# Patient Record
Sex: FEMALE | Race: ASIAN | NOT HISPANIC OR LATINO | Employment: UNEMPLOYED | URBAN - METROPOLITAN AREA
[De-identification: names, ages, dates, MRNs, and addresses within clinical notes are randomized per-mention and may not be internally consistent; named-entity substitution may affect disease eponyms.]

---

## 2024-05-25 ENCOUNTER — APPOINTMENT (OUTPATIENT)
Dept: RADIOLOGY | Facility: CLINIC | Age: 19
End: 2024-05-25
Payer: COMMERCIAL

## 2024-05-25 ENCOUNTER — HOSPITAL ENCOUNTER (EMERGENCY)
Facility: CLINIC | Age: 19
Discharge: HOME | End: 2024-05-25
Payer: COMMERCIAL

## 2024-05-25 VITALS
DIASTOLIC BLOOD PRESSURE: 75 MMHG | WEIGHT: 135 LBS | HEART RATE: 100 BPM | OXYGEN SATURATION: 100 % | SYSTOLIC BLOOD PRESSURE: 137 MMHG | BODY MASS INDEX: 26.5 KG/M2 | HEIGHT: 60 IN | RESPIRATION RATE: 18 BRPM | TEMPERATURE: 98 F

## 2024-05-25 DIAGNOSIS — S89.92XA KNEE INJURY, LEFT, INITIAL ENCOUNTER: ICD-10-CM

## 2024-05-25 PROCEDURE — 99284 EMERGENCY DEPT VISIT MOD MDM: CPT | Mod: 25

## 2024-05-25 PROCEDURE — 29505 APPLICATION LONG LEG SPLINT: CPT | Mod: LT

## 2024-05-25 PROCEDURE — 73562 X-RAY EXAM OF KNEE 3: CPT | Mod: LT

## 2024-05-25 ASSESSMENT — ACTIVITIES OF DAILY LIVING (ADL): ADLS_ACUITY_SCORE: 33

## 2024-05-25 ASSESSMENT — COLUMBIA-SUICIDE SEVERITY RATING SCALE - C-SSRS
1. IN THE PAST MONTH, HAVE YOU WISHED YOU WERE DEAD OR WISHED YOU COULD GO TO SLEEP AND NOT WAKE UP?: NO
6. HAVE YOU EVER DONE ANYTHING, STARTED TO DO ANYTHING, OR PREPARED TO DO ANYTHING TO END YOUR LIFE?: NO
2. HAVE YOU ACTUALLY HAD ANY THOUGHTS OF KILLING YOURSELF IN THE PAST MONTH?: NO

## 2024-05-25 NOTE — DISCHARGE INSTRUCTIONS
Knee xray was reassuring. I anticipate you sustained a patellar dislocation +/- soft tissue injury.    NO VOLLEYBALL OR PHYSICAL ACTIVITY UNTIL YOU ARE CLEARED BY ORTHOPEDICS!!   Follow up with [facility]. [address] [place] [address] [phone]. Tell them you were seen in our emergency department and advised to be evaluated by their team.  Wear the immobilization brace to add stability to your injured joint/extremity until you are evaluated by orthopedics. This brace does not need to be worn while bathing.  Use crutches to walk until you are evaluated by orthopedics.  For pain, I recommend:  Ibuprofen (Advil) - 600 MG every 6 hours  Acetaminophen (Tylenol) - 500-1,000 MG every 6 hours  Follow the RICE protocol to help speed recovery:  Rest - Avoid putting weight on the painful joint/extremity and avoid activities that worsen the pain.  Ice - Use cold packs for 20 minutes at a time, every two to four hours, particularly after physical activity.   Compression - Lightly wrap the injured area in a soft bandage or ACE wrap until the inflammation dies down. Be sure not to compress the injured area too tightly.  Elevation - Elevate the injury higher than your heart while resting to reduce swelling    If symptoms worsen or you develop any of the following, please do not hesitate to come back to this emergency department for additional evaluation.   Call 911 anytime you think you may need emergency care. For example, call if:    You have symptoms of a blood clot in your lung (called a pulmonary embolism). These may include:  Sudden chest pain.  Trouble breathing.  Coughing up blood.   Call your doctor now or seek immediate medical care if:    You have severe or increasing pain.     Your leg or foot turns cold or changes color.     You cannot stand or put weight on your knee.     Your knee looks twisted or bent out of shape.     You cannot move your knee.     You have signs of infection,  such as:  Increased pain, swelling, warmth, or redness.  Red streaks leading from the knee.  Pus draining from a place on your knee.  A fever.     You have signs of a blood clot in your leg (called a deep vein thrombosis), such as:  Pain in your calf, back of the knee, thigh, or groin.  Redness and swelling in your leg or groin.   Watch closely for changes in your health, and be sure to contact your doctor if:    You have tingling, weakness, or numbness in your knee.     You have any new symptoms, such as swelling.     You have bruises from a knee injury that last longer than 2 weeks.     You do not get better as expected.

## 2024-05-25 NOTE — ED TRIAGE NOTES
Pt presents to the ED with c/o left knee pain after landing awkwardly while jumping in volleyball game. Pt felt/heard a pop.

## 2024-05-25 NOTE — ED PROVIDER NOTES
"Emergency Department Encounter  [facility] EMERGENCY ROOM  Carina Siddiqui   AGE: 19 year old SEX: female  YOB: 2005  MRN: [id number]      EVALUATION DATE & TIME: No admission date for patient encounter. ; PCP: No primary care provider on file.   ED PROVIDER: Aubrie Leija PA-C    CHIEF COMPLAINT: Knee Pain      MEDICAL DECISION MAKING   Carina Siddiqui is an otherwise healthy 19-year-old female who presents the ED for evaluation of left knee pain after sustaining an injury when landing wrong on her left knee earlier today in a volleyball game.  Patient reports hearing a \"pop\" and noticed her kneecap was off to the side.  Kneecap eventually reduced spontaneously but patient continues to have left knee pain.  No additional injuries or head trauma.  No medications taken prior to arrival.    Vitals reviewed and hemodynamically stable, afebrile. On exam, patient is well appearing and in no acute distress.  Left knee is swollen, patella appears to be in correct position. There is tenderness along posterior knee and patella. Full active ROM of left knee.  No joint instability, negative anterior and posterior drawer. Sensation intact to posterolateral calf, dorsal foot, and plantar foot. Distal left lower extremity warm with pulses intact. No unilateral calf pain, swelling, or tenderness.     The exact cause of patient's left knee pain remains unknown but based on evaluation today, symptoms do not appear to be due to a cause requiring emergent intervention at this time.  I have high suspicion for a patellar dislocation and possible intra-articular soft tissue injury.  XR imaging of left knee personally reviewed and significant for small effusion and without evidence of fracture or malalignment.  I have low suspicion for significant ligamentous injury, knee dislocation, quadriceps tendon rupture, gout/pseudogout, septic joint, DVT, or gonococcal arthritis.    Patient is " otherwise well appearing, hemodynamically stable, and without evidence of neurovascular injury or compartment syndrome, plan to discharge home.  Left knee placed in knee immobilizer and patient provided with crutches.  Reccommended symptomatic care including rest, ice, elevation and OTC analgesics. Patient was provided with clear, written instructions of potential danger signs and where and when to return for emergency care or re-evaluation. Prompt orthopedic evaluation recommended, [facility] contact information provided.    Medical Decision Making  Obtained supplemental history:Supplemental history obtained?: Documented in chart and Family Member/Significant Other  Reviewed external records: External records reviewed?: No  Care impacted by chronic illness:N/A  Care significantly affected by social determinants of health:N/A  Did you consider but not order tests?: Work up considered but not performed and documented in chart, if applicable  Did you interpret images independently?: Independent interpretation of ECG and images noted in documentation, when applicable.  Consultation discussion with other provider:Did you involve another provider (consultant, MH, pharmacy, etc.)?: No  Discharge. No recommendations on prescription strength medication(s). I considered admission, but ultimately discharged patient no evidence of knee dislocation.    FINAL IMPRESSION       ICD-10-CM    1. Knee injury, left, initial encounter  S89.92XA           ED COURSE   3:07 PM I met and introduced myself to the patient. I gathered initial history and performed my physical exam.  Initial physical exam completed in upright chair in temporary exam room due to boarding crisis in ED while patient awaits for room with assigned RN and vital monitoring. We discussed plan for initial workup and patient was directed back to waiting room. Patient declining pain medication.  3:55 PM I rechecked the patient and discussed results, discharge,  "follow up, and reasons to return to the ED.     MEDICATIONS GIVEN IN THE EMERGENCY DEPARTMENT:   Medications - No data to display   NEW PRESCRIPTIONS STARTED AT TODAY'S ED VISIT:  New Prescriptions    No medications on file           =================================================================         HISTORY OF PRESENT ILLNESS   Patient information was obtained from: patient, significant other   Use of Intrepreter: N/A     Carina Siddiqui is an otherwise healthy 19-year-old female who presents the ED by private vehicle with significant other for evaluation of left knee pain.  Patient reports she was playing in a volleyball game this afternoon around 2 PM when she jumped up to spike the ball and landed on her left knee incorrectly.  She claims she heard a \"pop\" and then noticed her kneecap was to the side.  Her kneecap went back in place spontaneously but patient continues to have left knee pain.  Patient did not fall to the ground or hit her head.  Patient denies any other pain or injuries.  No chance of pregnancy.  No medications taken prior to arrival. No previous surgical intervention on knee. No fevers, chills, nausea, or vomiting.       MEDICAL HISTORY     No past medical history on file.    No past surgical history on file.    No family history on file.         No current outpatient medications on file.      PHYSICAL EXAM   VITALS:  Patient Vitals for the past 24 hrs:   BP Temp Temp src Pulse Resp SpO2 Height Weight   05/25/24 1438 137/75 98  F (36.7  C) Temporal 100 18 100 % 1.524 m (5') 61.2 kg (135 lb)     Body mass index is 26.37 kg/m .     Vitals reviewed. Nursing notes reviewed.  CONSITUTIONAL: Generally well-appearing female , in no acute distress. Well developed, nourished.  EYES: Conjunctivae clear, no discharge. EOM grossly intact.  HENT: Normocephalic, atraumatic, mucous membranes moist, nose normal.  NECK: Supple, gross ROM intact.   RESPIRATORY: Normal work of breathing, normal rate, speaks in " full sentences.  CARDIO: Normal heart rate.  GI: Nondistended.   MSK:   Left lower extremity: No significant erythema, bruising, lacerations or discoloration. Left knee swollen, patella appears to be in correct position. There is tenderness along posterior knee and patella. Full active ROM of left knee.  No joint instability, negative anterior and posterior drawer. Sensation intact to posterolateral calf, dorsal foot, and plantar foot. Distal left lower extremity warm with pulses intact. No unilateral calf pain, swelling, or tenderness.  +2 distal pulses with adequate capillary refill.  MSK exam is otherwise unremarkable  NEURO:  AxOx4. CN II-XII grossly intact, but not individually tested. No focal neurological deficits.   PSYCH: Thoughts linear and responses appropriate. Cooperative. Appropriate mood and affect.     LABS AND IMAGING   All pertinent labs reviewed and interpreted  Labs Ordered and Resulted from Time of ED Arrival to Time of ED Departure - No data to display    XR Knee Left 3 Views   Final Result   IMPRESSION: Normal joint spaces and alignment. No acute fracture. Possible trace joint effusion.        Aubrie Leija PA-C   Emergency Medicine   [facility] EMERGENCY ROOM  [address] [facility] [id number] [phone]  Dept: [phone]      Aubrie Leija PA-C  05/25/24 [id number]

## 2024-06-11 ENCOUNTER — TRANSCRIBE ORDERS (OUTPATIENT)
Dept: OTHER | Age: 19
End: 2024-06-11

## 2024-06-11 DIAGNOSIS — S83.512A LEFT ACL TEAR: Primary | ICD-10-CM

## 2024-06-14 ENCOUNTER — THERAPY VISIT (OUTPATIENT)
Dept: PHYSICAL THERAPY | Facility: CLINIC | Age: 19
End: 2024-06-14
Attending: ORTHOPAEDIC SURGERY
Payer: COMMERCIAL

## 2024-06-14 DIAGNOSIS — S83.512A RUPTURE OF ANTERIOR CRUCIATE LIGAMENT OF LEFT KNEE, INITIAL ENCOUNTER: ICD-10-CM

## 2024-06-14 DIAGNOSIS — M25.562 ACUTE PAIN OF LEFT KNEE: Primary | ICD-10-CM

## 2024-06-14 PROCEDURE — 97161 PT EVAL LOW COMPLEX 20 MIN: CPT | Mod: GP | Performed by: PHYSICAL THERAPIST

## 2024-06-14 PROCEDURE — 97110 THERAPEUTIC EXERCISES: CPT | Mod: GP | Performed by: PHYSICAL THERAPIST

## 2024-06-14 ASSESSMENT — ACTIVITIES OF DAILY LIVING (ADL)
AS_A_RESULT_OF_YOUR_KNEE_INJURY,_HOW_WOULD_YOU_RATE_YOUR_CURRENT_LEVEL_OF_DAILY_ACTIVITY?: ABNORMAL
STAND: ACTIVITY IS MINIMALLY DIFFICULT
WALK: ACTIVITY IS MINIMALLY DIFFICULT
SIT WITH YOUR KNEE BENT: ACTIVITY IS MINIMALLY DIFFICULT
GO UP STAIRS: ACTIVITY IS SOMEWHAT DIFFICULT
RISE FROM A CHAIR: ACTIVITY IS MINIMALLY DIFFICULT
GO DOWN STAIRS: ACTIVITY IS SOMEWHAT DIFFICULT
KNEE_ACTIVITY_OF_DAILY_LIVING_SUM: 42
WEAKNESS: I HAVE THE SYMPTOM BUT IT DOES NOT AFFECT MY ACTIVITY
GIVING WAY, BUCKLING OR SHIFTING OF KNEE: THE SYMPTOM AFFECTS MY ACTIVITY MODERATELY
KNEE_ACTIVITY_OF_DAILY_LIVING_SCORE: 60
KNEEL ON THE FRONT OF YOUR KNEE: ACTIVITY IS VERY DIFFICULT
GO UP STAIRS: ACTIVITY IS SOMEWHAT DIFFICULT
HOW_WOULD_YOU_RATE_THE_CURRENT_FUNCTION_OF_YOUR_KNEE_DURING_YOUR_USUAL_DAILY_ACTIVITIES_ON_A_SCALE_FROM_0_TO_100_WITH_100_BEING_YOUR_LEVEL_OF_KNEE_FUNCTION_PRIOR_TO_YOUR_INJURY_AND_0_BEING_THE_INABILITY_TO_PERFORM_ANY_OF_YOUR_USUAL_DAILY_ACTIVITIES?: 50
GO DOWN STAIRS: ACTIVITY IS SOMEWHAT DIFFICULT
RAW_SCORE: 42
PLEASE_INDICATE_YOR_PRIMARY_REASON_FOR_REFERRAL_TO_THERAPY:: KNEE
SIT WITH YOUR KNEE BENT: ACTIVITY IS MINIMALLY DIFFICULT
KNEEL ON THE FRONT OF YOUR KNEE: ACTIVITY IS VERY DIFFICULT
PAIN: THE SYMPTOM AFFECTS MY ACTIVITY MODERATELY
LIMPING: I HAVE THE SYMPTOM BUT IT DOES NOT AFFECT MY ACTIVITY
STIFFNESS: THE SYMPTOM AFFECTS MY ACTIVITY SLIGHTLY
GIVING WAY, BUCKLING OR SHIFTING OF KNEE: THE SYMPTOM AFFECTS MY ACTIVITY MODERATELY
SWELLING: THE SYMPTOM AFFECTS MY ACTIVITY MODERATELY
WEAKNESS: I HAVE THE SYMPTOM BUT IT DOES NOT AFFECT MY ACTIVITY
SQUAT: ACTIVITY IS FAIRLY DIFFICULT
SWELLING: THE SYMPTOM AFFECTS MY ACTIVITY MODERATELY
HOW_WOULD_YOU_RATE_THE_OVERALL_FUNCTION_OF_YOUR_KNEE_DURING_YOUR_USUAL_DAILY_ACTIVITIES?: ABNORMAL
WALK: ACTIVITY IS MINIMALLY DIFFICULT
AS_A_RESULT_OF_YOUR_KNEE_INJURY,_HOW_WOULD_YOU_RATE_YOUR_CURRENT_LEVEL_OF_DAILY_ACTIVITY?: ABNORMAL
SQUAT: ACTIVITY IS FAIRLY DIFFICULT
RISE FROM A CHAIR: ACTIVITY IS MINIMALLY DIFFICULT
PAIN: THE SYMPTOM AFFECTS MY ACTIVITY MODERATELY
HOW_WOULD_YOU_RATE_THE_CURRENT_FUNCTION_OF_YOUR_KNEE_DURING_YOUR_USUAL_DAILY_ACTIVITIES_ON_A_SCALE_FROM_0_TO_100_WITH_100_BEING_YOUR_LEVEL_OF_KNEE_FUNCTION_PRIOR_TO_YOUR_INJURY_AND_0_BEING_THE_INABILITY_TO_PERFORM_ANY_OF_YOUR_USUAL_DAILY_ACTIVITIES?: 50
STAND: ACTIVITY IS MINIMALLY DIFFICULT
HOW_WOULD_YOU_RATE_THE_OVERALL_FUNCTION_OF_YOUR_KNEE_DURING_YOUR_USUAL_DAILY_ACTIVITIES?: ABNORMAL
LIMPING: I HAVE THE SYMPTOM BUT IT DOES NOT AFFECT MY ACTIVITY
STIFFNESS: THE SYMPTOM AFFECTS MY ACTIVITY SLIGHTLY

## 2024-06-14 NOTE — PROGRESS NOTES
PHYSICAL THERAPY EVALUATION  Type of Visit: Evaluation             Subjective       Presenting condition or subjective complaint: Left Knee - ACL torn.  Playing VB.  Surgery is scheduled for June 27th  Date of onset: 05/25/24    Relevant medical history:     Dates & types of surgery:      Prior diagnostic imaging/testing results: MRI; X-ray     Prior therapy history for the same diagnosis, illness or injury: No      Prior Level of Function  Transfers: Independent  Ambulation: Independent  ADL: Independent  IADL:     Living Environment  Social support: With a significant other or spouse   Type of home: House; 2-story   Stairs to enter the home: Yes 2 Is there a railing: No     Ramp: No   Stairs inside the home: Yes 1 Is there a railing: Yes     Help at home: None  Equipment owned:       Employment: No    Hobbies/Interests: Volleyball, Fishing , [employer]    Patient goals for therapy: Physical activities.    Pain assessment:      Objective   KNEE EVALUATION  PAIN: Pain Level at Rest: 0/10  Pain Level with Use: 2/10  Pain Location: knee  Pain Quality: Aching  Pain Frequency: intermittent  Pain is Worst: daytime  Pain is Exacerbated By: down stair;  squatting  Pain is Relieved By: brace  Pain Progression: Improved  INTEGUMENTARY (edema, incisions):  mild edema   POSTURE: WNL  GAIT:  Weightbearing Status: WBAT  Assistive Device(s): None  Gait Deviations: lacks full ext with gait - with and without brace  BALANCE/PROPRIOCEPTION: WNL  WEIGHTBEARING ALIGNMENT:   NON-WEIGHTBEARING ALIGNMENT:   ROM:   (Degrees) Left AROM Left PROM  Right AROM Right PROM   Knee Flexion 111  126    Knee Extension 0  9    Pain:   End feel:     STRENGTH:   Pain: - none + mild ++ moderate +++ severe  Strength Scale: 0-5/5 Left Right   Knee Flexion 5- 5   Knee Extension 5- 5   Quad Set       FLEXIBILITY:   SPECIAL TESTS:   FUNCTIONAL TESTS:   PALPATION: WNL  JOINT MOBILITY: WNL    Assessment & Plan   CLINICAL IMPRESSIONS  Medical Diagnosis: L ACL  tear    Treatment Diagnosis: L ACL tear   Impression/Assessment: Patient is a 19 year old female with L knee complaints.  The following significant findings have been identified: Pain, Decreased ROM/flexibility, Decreased strength, Impaired gait, Impaired muscle performance, and Decreased activity tolerance. These impairments interfere with their ability to perform self care tasks, recreational activities, household chores, driving , household mobility, and community mobility as compared to previous level of function.     Clinical Decision Making (Complexity):  Clinical Presentation: Stable/Uncomplicated  Clinical Presentation Rationale: based on medical and personal factors listed in PT evaluation  Clinical Decision Making (Complexity): Low complexity    PLAN OF CARE  Treatment Interventions:  Interventions: Neuromuscular Re-education, Therapeutic Activity, Therapeutic Exercise    Long Term Goals     PT Goal 1  Goal Identifier: L knee  Goal Description: indendent with HEP 1 visit  Target Date: 06/14/24  Date Met: 06/14/24      Frequency of Treatment: 1x/ week  Duration of Treatment: 1 week    Recommended Referrals to Other Professionals:   Education Assessment:   Learner/Method: Patient;Demonstration;Pictures/Video    Risks and benefits of evaluation/treatment have been explained.   Patient/Family/caregiver agrees with Plan of Care.     Evaluation Time:     PT Eval, Low Complexity Minutes (25380): 15       Signing Clinician: Nelson De Jesus, PT        [facility]                                                                                   OUTPATIENT PHYSICAL THERAPY      PLAN OF TREATMENT FOR OUTPATIENT REHABILITATION   Patient's Last Name, First Name, Carina Lane YOB: 2005   Provider's Name   [facility]   Medical Record No.  [id number]     Onset Date: 05/25/24  Start of Care Date: 06/14/24     Medical Diagnosis:  L ACL  tear      PT Treatment Diagnosis:  L ACL tear Plan of Treatment  Frequency/Duration: 1x/ week/ 1 week    Certification date from 06/14/24 to 06/14/24         See note for plan of treatment details and functional goals     Nelson De Jesus, PT                         I CERTIFY THE NEED FOR THESE SERVICES FURNISHED UNDER        THIS PLAN OF TREATMENT AND WHILE UNDER MY CARE .             Physician Signature               Date    X_____________________________________________________                  Referring Provider:  Gasper Pendleton    Initial Assessment  See Epic Evaluation- Start of Care Date: 06/14/24

## 2024-07-05 PROBLEM — Z47.89 AFTERCARE FOR ANTERIOR CRUCIATE LIGAMENT (ACL) REPAIR: Status: ACTIVE | Noted: 2024-07-05

## 2024-07-09 ENCOUNTER — THERAPY VISIT (OUTPATIENT)
Dept: PHYSICAL THERAPY | Facility: CLINIC | Age: 19
End: 2024-07-09
Payer: COMMERCIAL

## 2024-07-09 DIAGNOSIS — Z47.89 AFTERCARE FOR ANTERIOR CRUCIATE LIGAMENT (ACL) REPAIR: Primary | ICD-10-CM

## 2024-07-09 PROCEDURE — 97110 THERAPEUTIC EXERCISES: CPT | Mod: GP

## 2024-07-16 ENCOUNTER — THERAPY VISIT (OUTPATIENT)
Dept: PHYSICAL THERAPY | Facility: CLINIC | Age: 19
End: 2024-07-16
Payer: COMMERCIAL

## 2024-07-16 DIAGNOSIS — Z47.89 AFTERCARE FOR ANTERIOR CRUCIATE LIGAMENT (ACL) REPAIR: Primary | ICD-10-CM

## 2024-07-16 DIAGNOSIS — M25.562 ACUTE PAIN OF LEFT KNEE: ICD-10-CM

## 2024-07-16 PROCEDURE — 97140 MANUAL THERAPY 1/> REGIONS: CPT | Mod: GP

## 2024-07-16 PROCEDURE — 97112 NEUROMUSCULAR REEDUCATION: CPT | Mod: GP

## 2024-07-16 PROCEDURE — 97110 THERAPEUTIC EXERCISES: CPT | Mod: GP

## 2024-07-28 ENCOUNTER — HEALTH MAINTENANCE LETTER (OUTPATIENT)
Age: 19
End: 2024-07-28

## 2024-07-30 ENCOUNTER — THERAPY VISIT (OUTPATIENT)
Dept: PHYSICAL THERAPY | Facility: CLINIC | Age: 19
End: 2024-07-30
Payer: COMMERCIAL

## 2024-07-30 DIAGNOSIS — Z47.89 AFTERCARE FOR ANTERIOR CRUCIATE LIGAMENT (ACL) REPAIR: Primary | ICD-10-CM

## 2024-07-30 DIAGNOSIS — M25.562 ACUTE PAIN OF LEFT KNEE: ICD-10-CM

## 2024-07-30 PROCEDURE — 97110 THERAPEUTIC EXERCISES: CPT | Mod: GP

## 2024-08-09 ENCOUNTER — THERAPY VISIT (OUTPATIENT)
Dept: PHYSICAL THERAPY | Facility: CLINIC | Age: 19
End: 2024-08-09
Payer: COMMERCIAL

## 2024-08-09 DIAGNOSIS — Z47.89 AFTERCARE FOR ANTERIOR CRUCIATE LIGAMENT (ACL) REPAIR: Primary | ICD-10-CM

## 2024-08-09 PROCEDURE — 97140 MANUAL THERAPY 1/> REGIONS: CPT | Mod: GP

## 2024-08-30 ENCOUNTER — THERAPY VISIT (OUTPATIENT)
Dept: PHYSICAL THERAPY | Facility: CLINIC | Age: 19
End: 2024-08-30
Payer: COMMERCIAL

## 2024-08-30 DIAGNOSIS — Z47.89 AFTERCARE FOR ANTERIOR CRUCIATE LIGAMENT (ACL) REPAIR: Primary | ICD-10-CM

## 2024-08-30 PROCEDURE — 97110 THERAPEUTIC EXERCISES: CPT | Mod: GP

## 2024-08-30 PROCEDURE — 97112 NEUROMUSCULAR REEDUCATION: CPT | Mod: GP

## 2024-09-05 ENCOUNTER — THERAPY VISIT (OUTPATIENT)
Dept: PHYSICAL THERAPY | Facility: CLINIC | Age: 19
End: 2024-09-05
Payer: COMMERCIAL

## 2024-09-05 DIAGNOSIS — Z47.89 AFTERCARE FOR ANTERIOR CRUCIATE LIGAMENT (ACL) REPAIR: Primary | ICD-10-CM

## 2024-09-05 PROCEDURE — 97110 THERAPEUTIC EXERCISES: CPT | Mod: GP

## 2024-09-05 PROCEDURE — 97530 THERAPEUTIC ACTIVITIES: CPT | Mod: GP

## 2024-09-05 NOTE — PROGRESS NOTES
Carina has been in physical therapy since 07/05/2024. Doing very well, continuing to work on quad strength. Symmetry is % with level 1 testing. Will progress to jumping/running after next MD followup per MD advice/protocol.      PLAN  Continue therapy per current plan of care.    Beginning/End Dates of Progress Note Reporting Period:  07/05/24 to 09/05/2024    Referring Provider:  Gasper Pendleton      Single Leg Stance and Reach Anteromedial Anterolateral   Uninvolved Extremity 58 cm 58 cm   Involved Extremity 52 cm 58 cm   Percent Return 89 % 100%   Comments / Observations: WNL    Single Leg Balance Time (maximum of 60 seconds)   Uninvolved Extremity 14 seconds   Involved Extremity 30 seconds   Percent Return 150 %   Eyes Open vs Closed Eyes closed   Comments / Observations: arch collapse ELAINE    Single Leg Squat    Uninvolved Extremity 100 degrees   Involved Extremity 100 degrees   Percent Return 100 %   Comments / Observations: shaking on LLE, slighy dynamic knee valgus L>R    Retro Step    Uninvolved Extremity 6 inches   Involved Extremity 6 inches   Percent Return 100 %   Comments / Observations: slight dynnamic knee valgus ELAINE      Prone Plank Hold    Hold Time (maximum 60 seconds) 60sec   Perceived Exertion 5/10   Percent Return 100 %   Basic vs Advanced Extended legs and elbows   Comments / Observations: WNL    Side Plank Hold Hold Time (maximum 60 seconds) Percent Return Perceived Exertion   Uninvolved Side Down 60sec 100 % 2/10   Involved Side Down 60sec 100 % 3/10   Basic vs Advanced Advanced, straight legs and elbow    Comments / Observations: WFL    Single Leg Bridge Reps Completed (maximum 20) Percent of Reps Completed Perceived Exertion   Uninvolved Extremity 20 100 % 1/10   Involved Extremity 20 100 % 2/10   Comments / Observations: 88bpm

## 2024-09-20 ENCOUNTER — THERAPY VISIT (OUTPATIENT)
Dept: PHYSICAL THERAPY | Facility: CLINIC | Age: 19
End: 2024-09-20
Payer: COMMERCIAL

## 2024-09-20 DIAGNOSIS — M25.562 ACUTE PAIN OF LEFT KNEE: ICD-10-CM

## 2024-09-20 DIAGNOSIS — Z47.89 AFTERCARE FOR ANTERIOR CRUCIATE LIGAMENT (ACL) REPAIR: Primary | ICD-10-CM

## 2024-09-20 PROCEDURE — 97110 THERAPEUTIC EXERCISES: CPT | Mod: GP

## 2024-09-20 PROCEDURE — 97530 THERAPEUTIC ACTIVITIES: CPT | Mod: GP

## 2024-10-09 ENCOUNTER — THERAPY VISIT (OUTPATIENT)
Dept: PHYSICAL THERAPY | Facility: CLINIC | Age: 19
End: 2024-10-09
Payer: COMMERCIAL

## 2024-10-09 DIAGNOSIS — Z47.89 AFTERCARE FOR ANTERIOR CRUCIATE LIGAMENT (ACL) REPAIR: Primary | ICD-10-CM

## 2024-10-09 DIAGNOSIS — M25.562 ACUTE PAIN OF LEFT KNEE: ICD-10-CM

## 2024-10-09 PROCEDURE — 97530 THERAPEUTIC ACTIVITIES: CPT | Mod: GP

## 2024-10-09 PROCEDURE — 97112 NEUROMUSCULAR REEDUCATION: CPT | Mod: GP

## 2024-10-09 PROCEDURE — 97110 THERAPEUTIC EXERCISES: CPT | Mod: GP

## 2024-10-09 NOTE — PROGRESS NOTES
[facility]                                                                                   OUTPATIENT PHYSICAL THERAPY    PLAN OF TREATMENT FOR OUTPATIENT REHABILITATION   Patient's Last Name, First Name, Carina Lane YOB: 2005   Provider's Name   [facility]   Medical Record No.  [id number]     Onset Date: 06/27/24  Start of Care Date: 07/05/24     Medical Diagnosis:  s/p L ACL recon      PT Treatment Diagnosis:  s/p L ACL recon Plan of Treatment  Frequency/Duration: 1x/ EOW/ 12 weeks    Certification date from 09/28/24 to 12/21/24         See note for plan of treatment details and functional goals     WILMAN FERNANDES, PT                         I CERTIFY THE NEED FOR THESE SERVICES FURNISHED UNDER        THIS PLAN OF TREATMENT AND WHILE UNDER MY CARE     (Physician attestation of this document indicates review and certification of the therapy plan).              Referring Provider:  Gasper Pendleton    Initial Assessment  See Epic Evaluation- Start of Care Date: 07/05/24